# Patient Record
Sex: MALE | Race: WHITE
[De-identification: names, ages, dates, MRNs, and addresses within clinical notes are randomized per-mention and may not be internally consistent; named-entity substitution may affect disease eponyms.]

---

## 2018-03-08 NOTE — EKG
[place] [address]
Phone:  [phone]                     ELECTROCARDIOGRAM REPORT      
_______________________________________________________________________________
 
Name:       CHASJYOTI PIERCE SR              Room:                      [facility]#:  [id number]      Account #:      [id number]  
Admission:  18     Attend Phys:    Vicki Lind DO
Discharge:               Date of Birth:  [birth year]  
         Report #: [id number]
    [id number]
_______________________________________________________________________________
THIS REPORT FOR:  //name//                      
 
                          [facility]
                                       
Test Date:    2018               Test Time:    12:11:50
Pat Name:     JYOTI DOHERTY             Department:   
Patient ID:   [id number]            Room:          
Gender:                               Technician:   
:          1960               Requested By: Vicki Lind
Order Number: [id number]    Donya MD:   Aramis Pérez
                                 Measurements
Intervals                              Axis          
Rate:         65                       P:            72
WA:           179                      QRS:          -20
QRSD:         98                       T:            44
QT:           380                                    
QTc:          396                                    
                           Interpretive Statements
Sinus rhythm
Borderline left axis deviation
Baseline wander in lead(s) V2
Compared to ECG 2017 16:21:08
No significant changes
 
Electronically Signed On 3-8-2018 15:39:13 CST by Aramis Pérez
[email or web address]
 
 
 
 
 
 
 
 
 
 
 
 
 
 
 
 
 
  <ELECTRONICALLY SIGNED>
                                           By: Aramis Pérez MD, [facility]   
  18     1539
D: 18 1211   _____________________________________
T: 18   Aramis Pérez MD, [facility]     /EPI

## 2018-03-10 NOTE — OP
[address]  [address]                    OPERATIVE REPORT              
_______________________________________________________________________________
 
Name:       JYOTI DOHERTY SR              Room:                      [facility]#:  [id number]      Account #:      [id number]  
Admission:  03/08/18     Attend Phys:    Vicki Lind DO
Discharge:               Date of Birth:  [birth year]  
         Report #: [id number]
                                                                     [id number]  
_______________________________________________________________________________
THIS REPORT FOR:  //name//                      
 
CC: Michael Lind
 
DATE OF SERVICE:  03/08/2018
 
 
PREOPERATIVE DIAGNOSES:  Recurrent lipoma, left inner thigh and new right
anterior medial mid forearm lipoma.
 
POSTOPERATIVE DIAGNOSES:   Recurrent lipoma, left inner thigh and new right
anterior medial mid forearm lipoma.
 
FINDINGS:  Left thigh lipoma measured 9.5 x 6.5 x 3 cm.  The incision was 8 x 4
x 2 cm.  Forearm lipoma measured 2 x 1 x 0.5 cm.  Right forearm incision
measured 2 x 0.5 x 0.3 cm, depth of dissection for both the subcutaneous tissue.
 
SURGEON:  Vicki Lind DO.
 
COSURGEON:  Jalen Newton, PGY1 and Melecio Bradshaw, PGY-1.
 
ASSISTANT:  Santosh Agarwal MS3.
 
OPERATION PERFORMED:  Excision of lipoma x 2.
 
ANESTHESIA:  LMA and local.
 
ESTIMATED BLOOD LOSS:  5 mL.
 
DRAINS:  None.
 
SPECIMEN:  Lipomas x 2.
 
CONDITION:  Stable.
 
DISPOSITION:  PACU to home.
 
HISTORY OF PRESENT ILLNESS:  The patient is a very pleasant 58-year-old
gentleman who presented to my office with a complaint of a new lipoma on his
right forearm and a recurrent lipoma on his left thigh.  They were both causing
him pain and were growing rapidly.  He was then consented for excision.  Risks
discussed included bleeding, infection, pain, scar formation, recurrence and
risks of anesthesia.  The patient understood these risks and elected to proceed.
 
DESCRIPTION OF PROCEDURE:  The patient was brought to the operating room.  He
 
 
 
[place] [address]                    OPERATIVE REPORT              
_______________________________________________________________________________
 
Name:       JYOTI DOHERTY SR              Room:                      [facility]#:  [id number]      Account #:      [id number]  
Admission:  03/08/18     Attend Phys:    Vicki Lind DO
Discharge:               Date of Birth:  [birth year]  
         Report #: [id number]
                                                                     [id number]  
_______________________________________________________________________________
was laid supine on the operating room table.  SCDs were placed on bilateral
lower extremities.  Ancef was given in the perioperative period.  General LMA
anesthesia was induced by Anesthesia without difficulty.  The patient was placed
in stirrups left thigh and right forearm were prepped and draped in standard
sterile fashion.  Timeout was performed to verify the patient and procedure.  A
10 mL of 0.5% Marcaine were injected over both lipomas.  On the right forearm, a
2 cm incision was made using 15 blade.  Cautery was used for hemostasis.  The
lipoma was encountered directly under the skin.  It was dissected free using a
combination of blunt and cautery dissection, that dissection was subcutaneous
tissue.  Specimen measured 2 x 1 x 0.5 cm.  It was then handed off.  Hemostasis
was assured.  Wound was closed in a layered fashion using deep and superficial
stitches of 3-0 Vicryl in inverted interrupted fashion.  Skin was closed with a
running 4-0 Monocryl.  We then turned our attention to the left thigh and an 8 x
4 cm elliptical incision was made in the area of the palpable lipoma.  Care was
taken to include the previous incision.  Cautery was used for hemostasis. 
Lipoma was again encountered directly under the skin.  It was dissected free
using a combination of blunt and cautery dissection.  Depth of dissection was
subcutaneous tissue.  The mass was handed off.  It measured 9.5 x 6.5 x 3 cm. 
Hemostasis was assured.  Wound was then closed in a layered fashion using deep
and superficial stitches of 3-0 Vicryl in inverted interrupted fashion.  Skin
was closed with running 4-0 Monocryl.  A total of 30 mL of 0.5% Marcaine were
used to anesthetize the wounds.  Wounds were then cleansed and covered with
Mastisol, Steri-Strips, 4 x 4's, and a Tegaderm.  The patient was then allowed
to awake from anesthesia, was extubated and transported to the recovery room
with no further difficulties.  Counts were correct at the conclusion of the
case.
 
 
 
 
 
 
 
 
 
 
 
 
 
 
 
 
 
 
<ELECTRONICALLY SIGNED>
                                        By:  Vicki Lind DO         
03/10/18     1146
D: 03/08/18 1453_______________________________________
T: 03/08/18 1551Cclifton Lind DO            /nt

## 2018-03-12 NOTE — S
[place] [address]                    SURGICAL PATH RPT PROCEDURE   
_______________________________________________________________________________
 
Name:       JYOTI CAVAZOS               Room:                      [facility]#:  [id number]      Account #:      [id number]  
Admission:  03/08/18     Date of Birth:  [birth year]  
Discharge:                             Report #:    [id number]
                                                         Path Case #: [id number] 
_______________________________________________________________________________
 
  
PATHOLOGY REPORT 
    
COLLECTION DATE: 3/8/2018   RECEIVED DATE: 3/9/2018  
 SUBMITTING PHYS: Dr. Vicki Lind
OTHER PHYS:
Dr. Michael Hicks
   
SPECIMEN(S) RECEIVED:
A.Right forearm mass/lipoma
B.Left posterior thigh mass/lipoma
    
* * * * * * * * * * * *
   
FINAL DIAGNOSIS:
A. Fibroadipose tissue "right forearm mass/lipoma":
- Lobulated fibroadipose tissue consistent with a lipoma. 
B. Fibroadipose tissue "left posterior thigh mass/lipoma":
- Mature lobulated fibroadipose tissue consistent with a lipoma. 
- Focal fat necrosis. 
 
COMMENT:
This case is also reviewed by Dr. Tali Ludwig 
(SHA:pit; 3/12/2018)
 
PATHOLOGIST:   Suman Wells M.D. 
REPORT ELECTRONICALLY SIGNED BY:   Suman Wells M.D.
DATE/TIME:   3/12/2018 15:34
    
* * * * * * * * * * * *
 
GROSS PATHOLOGY:
A.  Received in formalin labeled "Jyoti Cavazos, right forearm mass"
and consists of a thinly encapsulated, oval, glistening, and yellow
orange fragment of fat measuring 2.0 x 1.0 x 0.6 cm.  The specimen is
serially transversely sectioned and entirely submitted as A1.
B.  Received in formalin labeled "Jyoti Cavazos, left posterior thigh
mass" and consists of a lobulated, thinly encapsulated, glistening,
and yellow orange fatty mass measuring 8.0 x 6.0 x 4.2 cm.  There is
a segment of tan skin at one surface measuring 5.5 x 1.7 cm.  The
skin surface shows a possible cicatrix.  The fatty mass is inked and
sectioning reveals possible and minimal focal fat necrosis deep to
the epidermis.  The remaining cut surfaces are glistening, yellow
orange, and lobulated.  Representative sections are submitted B1-B6. 
(ZULEYMA; 3/9/2018)
 
 
CLINICAL HISTORY:
 
[place] [address]                    SURGICAL PATH RPT PROCEDURE   
_______________________________________________________________________________
 
Name:       JYOTI CAVAZOS               Room:                      [facility]#:  [id number]      Account #:      [id number]  
Admission:  03/08/18     Date of Birth:  [birth year]  
Discharge:                             Report #:    [id number]
                                                         Path Case #: [id number] 
_______________________________________________________________________________
Mass on left thigh and right forearm
 
INITIAL CPT CODE(S):
A; 65731
B; 16910
Professional services performed by [facility] at 
[address] [address]
 
  Technical services performed by [facility] at [address] [place] [address].,
[address] [address].
 
 
   
[facility]
[address] [address]
PHONE:  [phone]
DIRECTOR:  Spencer W. Kerley, M.D.
* * *  END OF REPORT  * * *

## 2018-11-29 NOTE — NUR
PT ARRIVED ON UNIT AT 1311. PT AMBULATED TO BED WITH CANE AND NO DIFFICULTY.
PT ON ROOM AIR. VSS. ADMISSION COMPLETE. CARDIOLOGY SAW PT, FIRST PART OF
STRESS TEST DONE TODAY. PT TO BE NPO AFTER MIDNIGHT WITH NO CAFFIENE. WILL
CONTINUE TO MONITOR.

## 2018-11-30 NOTE — NUR
PT HAS RESTED IN HIS ROOM AND VISITED WITH FAMILY. HE HAS HAD NO C/O PAIN OR
DISCOMFORT. PT HAD A STRESS TEST WHICH CARDIO NURSE RACHAEL REPORTED AS NORMAL
AND SHE HAS SIGNED OFF. PT HAS HAD NO S OR SX OF ADVERSE REACTION TO ABT,
EDUCATION GIVEN.  HOURLY ROUNDING COMPLETE.

## 2018-11-30 NOTE — NUR
ASSUMED CARE OF PT ASSESSED AND DOCUMENTED. PT ON CARDIAC MONITER TRACING ST
. PT IS A&O WITH NO C/O PAIN. WSS WNL. PT IS ON FALL PRECAUTIONS PER
FACILITY PROTOCOL. PT IS AFEBRILE AND OM ROOM AIR. BED IS IN LOW POSTION CALL
LIGHT IS IN REACH. PT IS NPO FOR TESTING.

## 2018-11-30 NOTE — 2DMMODE
[place] [address]
Phone:  [phone] 2 D/M-MODE ECHOCARDIOGRAM     
_______________________________________________________________________________
 
Name:         JYOTI DOHERTY STAN MCMAHON             Room:          [address] [place] 
[facility]#:    [id number]     Account #:     [id number]  
Admission:    18    Attend Phys:   Isaiah Padilla, 
Discharge:                Date of Birth: [birth year]  
Date of Service: 18 0927  Report #:      [id number]
        [id number]
_______________________________________________________________________________
THIS REPORT FOR:  //name//                      
 
 
--------------- APPROVED REPORT --------------
 
 
Study performed:  2018 15:14:06
 
EXAM: Comprehensive 2D, Doppler, and color-flow 
Echocardiogram 
Patient Location: In-Patient   
Room #:  200     Status:  routine
 
      BSA:         2.18
HR: 71 bpm BP:          137/82 mmHg 
Rhythm: NSR     
 
Other Information 
Study Quality: Good
 
2D Dimensions
IVSd:  11.42 (7-11mm) LVOT Diam:  19.72 (18-24mm) 
LVDd:  44.28 mm  
PWd:  10.02 (7-11mm) Ascending Ao:  28.04 (22-36mm)
LVDs:  26.36 (25-40mm) 
Aortic Root:  30.28 mm 
 
Volumes
Left Atrial Volume (Systole) 
    LA ESV Index:  13.30 mL/m2
 
Aortic Valve
AoV Peak Bird.:  1.42 m/s 
AO Peak Gr.:  8.04 mmHg  LVOT Max P.04 mmHg
AO Mean Gr.:  4.46 mmHg  LVOT Mean PG:  3.76 mmHg
    LVOT Max V:  1.42 m/s
AO V2 VTI:  25.23 cm  LVOT Mean V:  0.89 m/s
STEVEN (VTI):  3.09 cm2  LVOT V1 VTI:  25.56 cm
 
Mitral Valve
    E/A Ratio:  1.21
    MV Decel. Time:  223.77 ms
MV E Max Bird.:  0.76 m/s 
MV PHT:  64.89 ms  
MVA (PHT):  3.39 cm2  
 
 
 
[place] [address]
Phone:  [phone]                     2 D/M-MODE ECHOCARDIOGRAM     
_______________________________________________________________________________
 
Name:         JYOTI DOHERTY              Room:          [address] [place] 
..#:    [id number]     Account #:     [id number]  
Admission:    18    Attend Phys:   Isaaih Padilla, 
Discharge:                Date of Birth: [birth year]  
Date of Service: 18 0927  Report #:      [id number]
        [id number]
_______________________________________________________________________________
TDI
E/Lateral E':  5.43 E/Medial E':  6.91
   Medial E' Bird.:  0.11 m/s
   Lateral E' Bird.:  0.14 m/s
 
Pulmonary Valve
PV Peak Bird.:  2.00 m/s PV Peak Gr.:  15.97 mmHg
 
Left Ventricle
The left ventricle is normal size. There is normal LV segmental wall 
motion. There is normal left ventricular wall thickness. Left 
ventricular systolic function is normal. The left ventricular 
ejection fraction is within the normal range. LVEF is 60%. The left 
ventricular diastolic function is normal.
 
Right Ventricle
The right ventricle is normal size. The right ventricular systolic 
function is normal.
 
Atria
The left atrium size is normal. The right atrium size is 
normal.
 
Aortic Valve
The aortic valve is normal in structure. No aortic regurgitation is 
present. There is no aortic valvular stenosis.
 
Mitral Valve
The mitral valve is normal in structure. There is no mitral valve 
regurgitation noted. No evidence of mitral valve stenosis.
 
Tricuspid Valve
The tricuspid valve is normal in structure. Unable to assess PA 
pressure. Trace tricuspid regurgitation.
 
Pulmonic Valve
The pulmonary valve is normal in structure. There is no pulmonic 
valvular regurgitation.
 
Great Vessels
The aortic root is normal in size. IVC is normal in size and 
collapses &gt;50% with inspiration.
 
Pericardium
There is no pericardial effusion.
 
 
 
[place] [address]
Phone:  [phone]                     2 D/M-MODE ECHOCARDIOGRAM     
_______________________________________________________________________________
 
Name:         JYOTI DOHERTY              Room:          [address] [place] 
[facility]#:    [id number]     Account #:     [id number]  
Admission:    18    Attend Phys:   Isaiah Padilla, 
Discharge:                Date of Birth: [birth year]  
Date of Service: 18 0927  Report #:      [id number]
        [id number]
_______________________________________________________________________________
&lt;Conclusion&gt;
The left ventricle is normal size.
There is normal left ventricular wall thickness.
Left ventricular systolic function is normal.
The left ventricular ejection fraction is within the normal 
range.
LVEF is 60%.
The left ventricular diastolic function is normal.
The right ventricle is normal size.
The left atrium size is normal.
The aortic valve is normal in structure.
The mitral valve is normal in structure.
The tricuspid valve is normal in structure.
IVC is normal in size and collapses &gt;50% with inspiration.
There is no pericardial effusion.
There is normal LV segmental wall motion.
 
 
 
 
 
 
 
 
 
 
 
 
 
 
 
 
 
 
 
 
 
 
 
 
 
 
 
 
  <ELECTRONICALLY SIGNED>
                                           By: Luis E Figueroa MD, FACC     
  18
D: 18   _____________________________________
T: 18   Luis E Figueroa MD, FACC       /INF

## 2018-11-30 NOTE — NUR
PT C/O CHEST/RIB DISCOMFORT W COUGH AND POSTION CHANGE. PT ALSO C/O "R
JAW PAIN FOR 4-5 SECONDS," R HEAD PAIN AND R EYE BLURRY VISION. PT ALSO
REPORTED HE USUALLY DRINKS 6 10CUP POTS OF COFFEE A DAY AND HASNT HAD ANY
CAFFINE IN 24+ HRS. NOTIFIED CHARGE NURSE AND DAY SHIFT NURSE. AND INSTRUCTED
PATIENT TO NOTIFY DR TODAY. PT DENIED STROKE SYMPTOMS AND NONE OBSERVED.

## 2018-11-30 NOTE — NUR
Pt is A&O. Resides at home with his wife. Independent with ADLs. Pt has a
walker and cane that he can use for mobility. Hx of [facility]. No hx of skilled.
Goal is home at [place], following for disposition.

## 2018-11-30 NOTE — NUR
ASSUMED PT CARE @ 1930. A+O X4. PT GIVEN TYLENOL FOR RIB PAIN W COUGH.
EFFECTIVE. NPO @ MIDNIGHT FOR CARDIOLOGY CONSULT IN AM.  NO OTHRE CONCERNS
REPORTED. SEE CHARTING AND EMAR.  CALL LIGHT AT BEDSIDE. HOURLY ROUNDING FOR
SAFETY.

## 2018-12-01 NOTE — NUR
DISCHARGE TO HOME
ALL DC INSTRUCTIONS GIVEN AND ACKNOWLEDGED AND SIGNED COPIES GIVEN
IV AND HEART MONITOR REMOVED
PERSONAL BELONGINGS RETURNED
ASSISTED OUT VIA WC GOOD CONDITION TO WAITING CAR

## 2018-12-01 NOTE — NUR
VITALS WNL. PT HAD X3 EMISIS THROUGH THE NIGHT, MEDICATION GIVEN STATUS
IMPROVED. SEE. MAR. SEE CHARTING. FALL PRECAUTIONS IN PLACE. HOURLY ROUNDING
FOR SAFETY.

## 2021-03-02 NOTE — EKG
[place] [address]
Phone:  [phone]                     ELECTROCARDIOGRAM REPORT      
_______________________________________________________________________________
 
Name:         JYOTI DOHERTY              Room:                     [facility]
M.R.#:    [id number]     Account #:     [id number]  
Admission:    21    Attend Phys:   Cj Lebron DO  
Discharge:                Date of Birth: [birth year]  
Date of Service: 21 1024  Report #:      [id number]
        [id number]
_______________________________________________________________________________
THIS REPORT FOR:  //name//                      
 
                          [facility]
                                       
Test Date:    2021               Test Time:    10:24:48
Pat Name:     JYOTI DOHERTY             Department:   
Patient ID:   [id number]            Room:          
Gender:                               Technician:   
:          1960               Requested By: Cj Lebron
Order Number: [id number]    Reading MD:   Jaren Fountain
                                 Measurements
Intervals                              Axis          
Rate:         54                       P:            50
NH:           175                      QRS:          23
QRSD:         98                       T:            72
QT:           398                                    
QTc:          378                                    
                           Interpretive Statements
Sinus bradycardia
Compared to ECG 2018 12:11:50
rate has slowed
Electronically Signed On 3-2-2021 12:51:24 CST by Jaren Fountain
[email or web address]
 
 
 
 
 
 
 
 
 
 
 
 
 
 
 
 
 
 
 
 
 
  <ELECTRONICALLY SIGNED>
                                           By: Jaren Fountain MD, [facility]      
  21     1251
D: 21 1024   _____________________________________
T: 21 1024   Jaren Fountain MD, FACC        /EPI

## 2021-03-10 NOTE — OP
[address]  [address]                    OPERATIVE REPORT              
_______________________________________________________________________________
 
Name:       JYOTI DOHERTY STAN MCMAHON              Room:           [address] 
ROSETTE#:  [id number]      Account #:      [id number]  
Admission:  03/09/21     Attend Phys:    CARMENCITA Merino
Discharge:               Date of Birth:  [birth year]  
         Report #: [id number]
                                                                     [id number]  
_______________________________________________________________________________
THIS REPORT FOR:  
 
cc:  Michael Hicks MD, Anthony MD                                                      ~
     Cj Lebron DO               
 
DICTATED BY: Jyoti Miller DO
 
DATE OF SERVICE:  03/09/2021
 
 
PREOPERATIVE DIAGNOSIS:  End-stage right knee degenerative joint disease.
 
POSTOPERATIVE DIAGNOSIS:  End-stage right knee degenerative joint disease.
 
PROCEDURE:  Right total knee arthroplasty with Melinda Persona total knee system
with the following sizes:
1.  Size 9 cruciate retained cemented femur.
2.  A size F cemented tibia.
3.  A size 12 medial congruent poly.
4.  A 35 mm all poly patella.
5.  Two bags of Biomet cement.
 
SURGEON:  Cj Lebron DO
 
ASSISTANT:  Jyoti Miller DO
 
ANESTHESIA:  General and regional pain block by Anesthesia.
 
FLUIDS:  Crystalloid per Anesthesia.
 
ESTIMATED BLOOD LOSS:  100 mL.
 
DRAINS:  None.
 
SPECIMENS:  None.
 
COMPLICATIONS:  None.
 
CONDITION:  Stable to PACU.
 
DISPOSITION:  Recovery in the PACU and transferred to the floor.
 
ANTIBIOTICS:  2 grams Ancef IV preop.
 
TOURNIQUET:  Approximately 20 minutes at 300 mmHg during cementation.
 
 
 
[address]  [address]                    OPERATIVE REPORT              
_______________________________________________________________________________
 
Name:       JYOTI DOHERTY SR              Room:           [address] 
ROSETTE#:  [id number]      Account #:      [id number]  
Admission:  03/09/21     Attend Phys:    CARMENCITA Merino
Discharge:               Date of Birth:  [birth year]  
         Report #: [id number]
                                                                     [id number]  
_______________________________________________________________________________
 
 
INDICATIONS FOR PROCEDURE:  The patient is a very pleasant 61-year-old male who
presented to our clinic with right knee pain.  He had a prior arthroscopic
surgery.  He had been evaluated previously and was found to have degenerative
joint disease.  His pain was significantly affecting his activities of daily
living.  He failed conservative measures including activity modification,
medications and injections.  We discussed right total knee arthroplasty.  Risks,
benefits, alternatives and possible complications were discussed at length
including but not limited to infection, need for repeat surgery, neurovascular
injury, DVT, PE, anesthetic complications, MI, and death.  He was understanding
and wished to proceed.
 
DESCRIPTION OF PROCEDURE:  The patient was met in the preoperative area.  He
consented both verbally and written.  The correct site was marked.  He was given
a regional block by Anesthesia preoperatively.  He was then transferred to the
operative suite and placed supine on the operative table.  He was given benefit
of general anesthesia. A well-padded nonsterile tourniquet was applied to the
right thigh.  The right lower extremity was then prepped and draped in the usual
sterile fashion.  A timeout was performed to identify the correct patient,
procedure and operative site.  All in the room were in agreement.  The procedure
began with a standard anterior midline incision.  This was carried down to the
level of the capsule, at which point a new knife was used to create a medial
parapatellar arthrotomy.  The patella was then everted.  We used the drill to
gain access to the intramedullary canal for the femur and placed the
intramedullary femoral cutting guide.  We then made the distal femur cut adding
a 2 mm to the cut.  We then turned our attention to the proximal tibial cut
where we used the extramedullary tibial guide.  We dialed in the appropriate
amount of slope and alignment in the coronal position.  The cutting block was
pinned into place and the proximal tibial cut was made, protecting the ligaments
and neurovascular structures.  We then used the AP sizer to size the femur to a
size 9.  The size 9 cutting block was then placed and the anterior, posterior
and chamfer cuts were then made without difficulty.  Excess bone was removed. 
We then removed the medial and lateral menisci and released the ACL and PCL. 
The patient was found to have equal flexion and extension gaps with a 10 spacer
block.  We then placed a trial tibia and used 2 screws to secure this into place
with the appropriate amount of rotation.  The trial femur was then placed as
well.  We then trialled with a variety of articular spacers.  Size 12 was found
to have the best stability and ensured full extension and 130 degrees of
flexion.  We then turned our attention to the patellar resurfacing.  We removed
osteophytes with a rongeur and then reamed the posterior aspect of the patella. 
This was sized to a 35.  The peg holes were then drilled.  Trial patella was
placed and was found to track well in the trochlear groove.  The drill holes
were then drilled through the femur.  The trial femur and patellar poly were
removed.  We then drilled and punched for the tibial keel.  The trial tibia was
then removed.  We thoroughly irrigated the bony ends.  Cement was mixed on the
 
 
 
[address]  [address]                    OPERATIVE REPORT              
_______________________________________________________________________________
 
Name:       JYOTI DOHERTY SR              Room:           [address] 
OLLIE.#:  [id number]      Account #:      [id number]  
Admission:  03/09/21     Attend Phys:    CARMENCITA Merino
Discharge:               Date of Birth:  [birth year]  
         Report #: [id number]
                                                                     [id number]  
_______________________________________________________________________________
 
back table and placed on the backside of the final components and on the bony
ends.  We malleted the final femur, tibia and patellar components.  Excess
cement was removed.  We placed 12 trial tibial spacer for compression and
allowed the cement to cure.  We did this after elevating the tourniquet.  Once
the cement was hardened, the patient was still found to have good range of
motion and stability with a size 12 spacer block.  The trial was removed and we
went with a size 12 medial congruent poly.  He had excellent range of motion
with full extension, 130 degrees of flexion and stability throughout range of
motion including mid flexion and deep flexion.  We injected orthopedic pain
cocktail around the periosteum, capsule and soft tissues.  Tourniquet was let
down.  We then reapproximated the capsule with #1 Vicryl in figure-of-eight
fashion.  We reapproximated the subcutaneous tissue with 2-0 Monocryl suture in
simple interrupted inverted fashion followed by running 3-0 subcuticular
Stratafix.  We then placed Dermabond glue and allowed this to dry.  We then
placed a sterile Mepilex dressing,  JEIMY hose and SCDs were applied.  The patient
tolerated the procedure well without complication.  He was transferred to the
PACU in stable condition.  All needle and sponge counts were correct x 2 at the
end of the case.
 
ATTESTATION:  Dr. Lebron was present and scrubbed throughout all critical aspects
of the case.
 
 
 
 
 
 
 
 
 
 
 
 
 
 
 
 
 
 
 
 
 
 
<ELECTRONICALLY SIGNED>
                                        By:  Cj Lebron DO              
03/10/21     0701
D: 03/09/21 1227_______________________________________
T: 03/09/21 DO tod Ramos

## 2021-11-18 ENCOUNTER — HOSPITAL ENCOUNTER (OUTPATIENT)
Dept: HOSPITAL 96 - M.LAB | Age: 61
End: 2021-11-18
Attending: INTERNAL MEDICINE
Payer: COMMERCIAL

## 2021-11-18 DIAGNOSIS — E78.5: Primary | ICD-10-CM

## 2021-11-18 LAB
CHOLEST SERPL-MCNC: 143 MG/DL (ref ?–200)
HDLC SERPL-MCNC: 45 MG/DL (ref 40–?)
LDLC SERPL-MCNC: 86 MG/DL (ref ?–100)
TC:HDL: 3.2 RATIO
TRIGL SERPL-MCNC: 64 MG/DL (ref ?–150)
VLDLC SERPL CALC-MCNC: 13 MG/DL (ref ?–40)